# Patient Record
Sex: FEMALE
[De-identification: names, ages, dates, MRNs, and addresses within clinical notes are randomized per-mention and may not be internally consistent; named-entity substitution may affect disease eponyms.]

---

## 2021-03-10 PROBLEM — Z00.00 ENCOUNTER FOR PREVENTIVE HEALTH EXAMINATION: Status: ACTIVE | Noted: 2021-03-10

## 2021-03-31 ENCOUNTER — APPOINTMENT (OUTPATIENT)
Dept: NEUROLOGY | Facility: CLINIC | Age: 56
End: 2021-03-31
Payer: COMMERCIAL

## 2021-03-31 PROCEDURE — 99417 PROLNG OP E/M EACH 15 MIN: CPT | Mod: 95

## 2021-03-31 PROCEDURE — 99205 OFFICE O/P NEW HI 60 MIN: CPT | Mod: 95

## 2021-04-02 NOTE — HISTORY OF PRESENT ILLNESS
[Home] : at home, [unfilled] , at the time of the visit. [Medical Office: ([facility])___] : at the medical office located in  [Verbal consent obtained from patient] : the patient, [unfilled] [FreeTextEntry1] : Reason for consult: MS\par \par HPI: Nupur Urbina is a 55 year old woman \par \par Dx'd in 1999 w/ MS after sudden numbness of the top of her fingers on R hand, lasted for months and disappeared. Had MRI, dx'd w MS.\par reports R arm/leg weakness very early on ~2000.\par Initially seen Dr. Guzman. \par Had seen Dr. Sofia.\par Had LP, was told that she may be able to get mesenchymal SC treatments in several years.\par Reports she is able to walk normally.\par Initially was on IFN, but then switched to copaxone. Currently on copaxone for ~20 years.\par Last saw Dr. Guzman in ~2019.\par Gradually worsened R sided weakness over the years.\par Dr. Sofia strongly recommended switch of DMT to ocrevus for SPMS.\par Had 1st pfizer vaccine, not yet 2nd.\par MRI brain 1y ago without change.\par Over past 3y, R side slightly weaker.\par \par ROS/Current Sx:\par R arm and leg weak\par worsening sx with heat/fever\par urinary dysfunction x5y\par \par PMHX:\par MS\par depression\par \par MEDS:\par copaxone\par lexapro\par vitD3 6k daily\par \par ALL: nkda\par \par SHx: no tob, no etoh, no drugs. 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  Normally conversant.  Follows commands, names, and repeats.  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 Likely slowly progressing over years and I agree that a DMT switch is reasonable as pt is relatively young and ambulatory.\par \par Pt had many questions regarding care of MS, including medication, lifestyle, and stem cell therapies, amongst others. these questions were answered in detail.\par \par She had been discussing with two prior MS neurologists about possible switch from copaxone to another DMT. I discussed full risk/benefit of aubagio, mayzent, and ocrevus, including data on progressive MS and infectious, neoplastic, and brain infection risks as they apply to each medication.\par \par All questions answered. education provided re: dx, ddx, rx, prognosis. management discussed at length.\par \par - discussed referral to urology - pt will defer for the moment.\par - planned for 2nd pfizer in 4/20/2021. advised tylenol/advil\par - to consider ampyra in the future.\par - pt to consider DMT options.\par - pt will RTC in person to discuss further after her 2nd dose of vaccine.\par \par \par \par \par

## 2021-06-03 ENCOUNTER — APPOINTMENT (OUTPATIENT)
Dept: NEUROLOGY | Facility: CLINIC | Age: 56
End: 2021-06-03
Payer: COMMERCIAL

## 2021-06-03 DIAGNOSIS — G35 MULTIPLE SCLEROSIS: ICD-10-CM

## 2021-06-03 PROCEDURE — 99214 OFFICE O/P EST MOD 30 MIN: CPT | Mod: 95

## 2021-06-03 NOTE — REASON FOR VISIT
[Follow-Up: _____] : a [unfilled] follow-up visit [Home] : at home, [unfilled] , at the time of the visit. [Medical Office: ([facility])___] : at the medical office located in  [Verbal consent obtained from patient] : the patient, [unfilled]

## 2021-06-03 NOTE — HISTORY OF PRESENT ILLNESS
[FreeTextEntry1] : Initial hx 3/2021\par Dx'd in 1999 w/ MS after sudden numbness of the top of her fingers on R hand, lasted for months and disappeared. Had MRI, dx'd w MS.\par reports R arm/leg weakness very early on ~2000.\par Initially seen Dr. Guzman. \par Had seen Dr. Sofia.\par Had LP, was told that she may be able to get mesenchymal SC treatments in several years.\par Reports she is able to walk normally.\par Initially was on IFN, but then switched to copaxone. Currently on copaxone for ~20 years.\par Last saw Dr. Guzman in ~2019.\par Gradually worsened R sided weakness over the years.\par Dr. Sofia strongly recommended switch of DMT to ocrevus for SPMS.\par Had 1st pfizer vaccine, not yet 2nd.\par MRI brain 1y ago without change.\par Over past 3y, R side slightly weaker.\par \par \par Subj interval:\par \par Pfizer, 2nd dose in early 5/2021.\par \par ROS/Current Sx:\par R arm and leg weak\par worsening sx with heat/fever\par urinary dysfunction x5y\par \par PMHX:\par MS\par depression\par \par MEDS:\par copaxone\par lexapro\par vitD3 6k daily\par \par \par Exam:\par \par AO3. Normally conversant. Follows commands, names, and repeats. 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 Likely slowly progressing over years and I agree that a DMT switch is reasonable as pt is relatively young and ambulatory.\par \par Pt had many questions regarding care of MS, including medication, lifestyle, and stem cell therapies, amongst others. these questions were answered in detail.\par \par She had been discussing with two prior MS neurologists about possible switch from copaxone to another DMT. I discussed full risk/benefit of aubagio, mayzent, and ocrevus, including data on progressive MS and infectious, neoplastic, and brain infection risks as they apply to each medication.\par \par All questions answered. education provided re: dx, ddx, rx, prognosis. management discussed at length. counseled on vaccine/covid.\par \par - new brain, C, and T MRIs (1y interval)\par - new blood work\par - pt to send CDs of prior MRIs done at [facility]\par - our office to try to obtain copy of MRIs done at Dr. Sofia's office.\par - to consider ampyra in the future.\par - pt to consider DMT options.\par - pt will RTC in person to discuss further

## 2021-07-16 ENCOUNTER — APPOINTMENT (OUTPATIENT)
Dept: MRI IMAGING | Facility: CLINIC | Age: 56
End: 2021-07-16

## 2021-07-17 ENCOUNTER — FORM ENCOUNTER (OUTPATIENT)
Age: 56
End: 2021-07-17

## 2021-12-05 ENCOUNTER — NON-APPOINTMENT (OUTPATIENT)
Age: 56
End: 2021-12-05

## 2021-12-13 ENCOUNTER — APPOINTMENT (OUTPATIENT)
Dept: NEUROLOGY | Facility: CLINIC | Age: 56
End: 2021-12-13

## 2023-03-24 ENCOUNTER — APPOINTMENT (OUTPATIENT)
Dept: MAMMOGRAPHY | Facility: CLINIC | Age: 58
End: 2023-03-24
Payer: COMMERCIAL

## 2023-03-24 PROCEDURE — 77067 SCR MAMMO BI INCL CAD: CPT

## 2023-03-24 PROCEDURE — 77063 BREAST TOMOSYNTHESIS BI: CPT

## 2023-03-26 ENCOUNTER — TRANSCRIPTION ENCOUNTER (OUTPATIENT)
Age: 58
End: 2023-03-26

## 2025-04-18 ENCOUNTER — APPOINTMENT (OUTPATIENT)
Dept: MAMMOGRAPHY | Facility: CLINIC | Age: 60
End: 2025-04-18
Payer: COMMERCIAL

## 2025-04-18 PROCEDURE — 77067 SCR MAMMO BI INCL CAD: CPT

## 2025-04-18 PROCEDURE — 77063 BREAST TOMOSYNTHESIS BI: CPT

## 2025-05-20 ENCOUNTER — APPOINTMENT (OUTPATIENT)
Dept: ULTRASOUND IMAGING | Facility: CLINIC | Age: 60
End: 2025-05-20